# Patient Record
Sex: MALE | Race: WHITE | NOT HISPANIC OR LATINO
[De-identification: names, ages, dates, MRNs, and addresses within clinical notes are randomized per-mention and may not be internally consistent; named-entity substitution may affect disease eponyms.]

---
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
## 2021-01-28 NOTE — ED ADULT NURSE NOTE - CHPI ED NUR TIMING2
· Med Name & Dose: cytomel  · Last refill was 2-7-20 Number of Refills sent: 0  · Quantity of medication given 90 day supply  · Last visit for that condition 12-11-19  · Date of next appt: Visit date not found  · Date of any Lab results pertaining to medication: tsh 3-4-19    Patient is overdue for TSH. Last seen 12-11-19. No future appointment scheduled. Please advise because patient is requesting 90 day supply. TSH order placed.                Negative gradual onset

## 2021-07-21 ENCOUNTER — TRANSCRIPTION ENCOUNTER (OUTPATIENT)
Age: 21
End: 2021-07-21

## 2021-11-08 ENCOUNTER — EMERGENCY (EMERGENCY)
Facility: HOSPITAL | Age: 21
LOS: 1 days | Discharge: HOME | End: 2021-11-08
Attending: EMERGENCY MEDICINE | Admitting: EMERGENCY MEDICINE
Payer: SELF-PAY

## 2021-11-08 VITALS
WEIGHT: 160.06 LBS | DIASTOLIC BLOOD PRESSURE: 79 MMHG | OXYGEN SATURATION: 97 % | TEMPERATURE: 98 F | HEART RATE: 110 BPM | HEIGHT: 67 IN | RESPIRATION RATE: 17 BRPM | SYSTOLIC BLOOD PRESSURE: 145 MMHG

## 2021-11-08 VITALS
DIASTOLIC BLOOD PRESSURE: 68 MMHG | HEART RATE: 71 BPM | SYSTOLIC BLOOD PRESSURE: 126 MMHG | OXYGEN SATURATION: 98 % | RESPIRATION RATE: 16 BRPM

## 2021-11-08 DIAGNOSIS — Z98.890 OTHER SPECIFIED POSTPROCEDURAL STATES: Chronic | ICD-10-CM

## 2021-11-08 DIAGNOSIS — S01.01XA LACERATION WITHOUT FOREIGN BODY OF SCALP, INITIAL ENCOUNTER: Chronic | ICD-10-CM

## 2021-11-08 PROCEDURE — 99284 EMERGENCY DEPT VISIT MOD MDM: CPT

## 2021-11-08 PROCEDURE — 72125 CT NECK SPINE W/O DYE: CPT | Mod: 26,MA

## 2021-11-08 PROCEDURE — 99284 EMERGENCY DEPT VISIT MOD MDM: CPT | Mod: 25

## 2021-11-08 PROCEDURE — 72125 CT NECK SPINE W/O DYE: CPT | Mod: MA

## 2021-11-08 RX ORDER — LIDOCAINE 4 G/100G
1 CREAM TOPICAL
Qty: 30 | Refills: 0
Start: 2021-11-08 | End: 2021-12-07

## 2021-11-08 RX ORDER — IBUPROFEN 200 MG
600 TABLET ORAL ONCE
Refills: 0 | Status: COMPLETED | OUTPATIENT
Start: 2021-11-08 | End: 2021-11-08

## 2021-11-08 RX ORDER — IBUPROFEN 200 MG
1 TABLET ORAL
Qty: 20 | Refills: 0
Start: 2021-11-08 | End: 2021-11-12

## 2021-11-08 RX ORDER — CYCLOBENZAPRINE HYDROCHLORIDE 10 MG/1
1 TABLET, FILM COATED ORAL
Qty: 12 | Refills: 0
Start: 2021-11-08 | End: 2021-11-11

## 2021-11-08 RX ADMIN — Medication 600 MILLIGRAM(S): at 11:39

## 2021-11-08 RX ADMIN — Medication 600 MILLIGRAM(S): at 12:39
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
## 2021-11-08 NOTE — ED ADULT NURSE NOTE - NSIMPLEMENTINTERV_GEN_ALL_ED
Implemented All Universal Safety Interventions:  [place] to call system. Call bell, personal items and telephone within reach. Instruct patient to call for assistance. Room bathroom lighting operational. Non-slip footwear when patient is off stretcher. Physically safe environment: no spills, clutter or unnecessary equipment. Stretcher in lowest position, wheels locked, appropriate side rails in place.

## 2021-11-08 NOTE — ED ADULT NURSE NOTE - NSICDXPASTSURGICALHX_GEN_ALL_CORE_FT
PAST SURGICAL HISTORY:  H/O circumcision     History of ankle surgery     Scalp laceration s/p staples Summer 2015
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
## 2021-11-08 NOTE — ED PROVIDER NOTE - PATIENT PORTAL LINK FT
You can access the FollowMyHealth Patient Portal offered by [facility] by registering at the following website: [email or web address]. By joining [employer]’s FollowMyHealth portal, you will also be able to view your health information using other applications (apps) compatible with our system.
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
## 2021-11-08 NOTE — ED PROVIDER NOTE - NSFOLLOWUPINSTRUCTIONS_ED_ALL_ED_FT
Cervical Strain    WHAT YOU NEED TO KNOW:    A cervical strain is a stretched or torn muscle or tendon in your neck. Tendons are strong tissues that connect muscles to bones.    DISCHARGE INSTRUCTIONS:    Return to the emergency department if:   •You have pain or numbness from your shoulder down to your hand.      •You have problems with your vision, hearing, or balance.      •You feel confused or cannot concentrate.      •You have problems with movement and strength.      Call your doctor if:   •You have increased swelling or pain in your neck.       •You have questions or concerns about your condition or care.      Medicines: You may need any of the following:   •Acetaminophen decreases pain and fever. It is available without a doctor's order. Ask how much to take and how often to take it. Follow directions. Read the labels of all other medicines you are using to see if they also contain acetaminophen, or ask your doctor or pharmacist. Acetaminophen can cause liver damage if not taken correctly. Do not use more than 4 grams (4,000 milligrams) total of acetaminophen in one day.       •NSAIDs, such as ibuprofen, help decrease swelling, pain, and fever. This medicine is available with or without a doctor's order. NSAIDs can cause stomach bleeding or kidney problems in certain people. If you take blood thinner medicine, always ask your healthcare provider if NSAIDs are safe for you. Always read the medicine label and follow directions.      •Muscle relaxers help decrease pain and muscle spasms.      •Prescription pain medicine may be given. Ask your healthcare provider how to take this medicine safely. Some prescription pain medicines contain acetaminophen. Do not take other medicines that contain acetaminophen without talking to your healthcare provider. Too much acetaminophen may cause liver damage. Prescription pain medicine may cause constipation. Ask your healthcare provider how to prevent or treat constipation.       •Take your medicine as directed. Contact your healthcare provider if you think your medicine is not helping or if you have side effects. Tell him or her if you are allergic to any medicine. Keep a list of the medicines, vitamins, and herbs you take. Include the amounts, and when and why you take them. Bring the list or the pill bottles to follow-up visits. Carry your medicine list with you in case of an emergency.      Manage your symptoms:   •Apply heat on your neck for 15 to 20 minutes, 4 to 6 times a day or as directed. Heat helps decrease pain, stiffness, and muscle spasms.      •Begin gentle neck exercises as soon as you can move your neck without pain. Exercises will help decrease stiffness and improve the strength and movement of your neck. Ask your healthcare provider what kind of exercises you should do.      •Gradually return to your usual activities as directed. Stop if you have pain. Avoid activities that can cause more damage to your neck, such as heavy lifting or strenuous exercise.      •Sleep without a pillow to help decrease pain. Instead, roll a small towel tightly and place it under your neck.       •Go to physical therapy as directed. A physical therapist teaches you exercises to help improve movement and strength, and to decrease pain.       Prevent another neck injury:   •Drive safely. Make sure everyone in your car wears a seatbelt. A seatbelt can save your life if you are in an accident. Do not use your cell phone when you are driving. This could distract you and cause an accident. Pull over if you need to make a call or send a text message.       •Wear helmets, lifejackets, and protective gear. Always wear a helmet when you ride a bike or motorcycle, go skiing, or play sports that could cause a head injury. Wear protective equipment when you play sports. Wear a lifejacket when you are on a boat or doing water sports.       Follow up with your doctor as directed: You may be referred to an orthopedist or physical therapies. Write down your questions so you remember to ask them during your visits.

## 2021-12-31 ENCOUNTER — EMERGENCY (EMERGENCY)
Facility: HOSPITAL | Age: 21
LOS: 1 days | Discharge: HOME | End: 2021-12-31
Attending: EMERGENCY MEDICINE | Admitting: EMERGENCY MEDICINE
Payer: COMMERCIAL

## 2021-12-31 VITALS
DIASTOLIC BLOOD PRESSURE: 76 MMHG | TEMPERATURE: 98 F | WEIGHT: 156.53 LBS | HEART RATE: 86 BPM | HEIGHT: 67 IN | RESPIRATION RATE: 18 BRPM | OXYGEN SATURATION: 100 % | SYSTOLIC BLOOD PRESSURE: 123 MMHG

## 2021-12-31 DIAGNOSIS — S01.01XA LACERATION WITHOUT FOREIGN BODY OF SCALP, INITIAL ENCOUNTER: Chronic | ICD-10-CM

## 2021-12-31 DIAGNOSIS — Z98.890 OTHER SPECIFIED POSTPROCEDURAL STATES: Chronic | ICD-10-CM

## 2021-12-31 PROCEDURE — 99284 EMERGENCY DEPT VISIT MOD MDM: CPT

## 2021-12-31 PROCEDURE — 99284 EMERGENCY DEPT VISIT MOD MDM: CPT | Mod: 25

## 2021-12-31 PROCEDURE — 70450 CT HEAD/BRAIN W/O DYE: CPT | Mod: MA

## 2021-12-31 PROCEDURE — 70450 CT HEAD/BRAIN W/O DYE: CPT | Mod: 26,MA

## 2021-12-31 RX ORDER — IBUPROFEN 200 MG
600 TABLET ORAL ONCE
Refills: 0 | Status: COMPLETED | OUTPATIENT
Start: 2021-12-31 | End: 2021-12-31

## 2021-12-31 RX ADMIN — Medication 600 MILLIGRAM(S): at 01:08

## 2021-12-31 RX ADMIN — Medication 600 MILLIGRAM(S): at 01:38

## 2021-12-31 NOTE — ED ADULT NURSE NOTE - OBJECTIVE STATEMENT
P tis alert, came to the E due to head pain after he fell backwards while snowboarding yesterday afternoon. Denies LOC or nausea or vomiting.WithHeadache of 6/10

## 2021-12-31 NOTE — ED PROVIDER NOTE - CLINICAL SUMMARY MEDICAL DECISION MAKING FREE TEXT BOX
pt p/w head ache s/p fall while skate boarding, normal Neuro exam pt p/w head ache s/p fall while skate boarding, normal Neuro exam, negative CT scan

## 2021-12-31 NOTE — ED PROVIDER NOTE - OBJECTIVE STATEMENT
22 y/o Male with no sig PMHX presents to ED c/o intense headache s/o fall backwards and hitting head s/p fall while skate boarding, no N/V , no LOC

## 2021-12-31 NOTE — ED PROVIDER NOTE - PATIENT PORTAL LINK FT
You can access the FollowMyHealth Patient Portal offered by [facility] by registering at the following website: [email or web address]. By joining [employer]’s FollowMyHealth portal, you will also be able to view your health information using other applications (apps) compatible with our system.

## 2022-05-02 NOTE — ED PROVIDER NOTE - CHPI ED SYMPTOMS NEG
Denied    Refills on file at requested pharmacy.    Last RX written: 4-29-22  #90  R-2     no deformity/no back pain/no numbness/no tingling/no weakness/no fever

## 2022-10-14 NOTE — ED ADULT NURSE NOTE - NS ED NURSE DISCH DISPOSITION
Discharged Valtrex Counseling: I discussed with the patient the risks of valacyclovir including but not limited to kidney damage, nausea, vomiting and severe allergy.  The patient understands that if the infection seems to be worsening or is not improving, they are to call.

## 2023-01-26 NOTE — ED PROVIDER NOTE - CPE EDP ENMT NORM
normal... Double O-Z Flap Text: The defect edges were debeveled with a #15 scalpel blade.  Given the location of the defect, shape of the defect and the proximity to free margins a Double O-Z flap was deemed most appropriate.  Using a sterile surgical marker, an appropriate transposition flap was drawn incorporating the defect and placing the expected incisions within the relaxed skin tension lines where possible. The area thus outlined was incised deep to adipose tissue with a #15 scalpel blade.  The skin margins were undermined to an appropriate distance in all directions utilizing iris scissors.

## 2023-05-09 NOTE — ED ADULT NURSE NOTE - BREATHING, MLM
Follow up in the office with Dr. Raghav Rodriges on June 2nd as scheduled (with chest x-ray). Keep area clean/dry with bandage for 24 hours. Do not bathe or shower tonight, starting tomorrow just shower for the next few days. Watch for any signs of bleeding or infection. Call the office at [phone] for any questions. Thoracentesis: What to Expect at Home  Your Recovery  Thoracentesis (say \"igns-qh-sxf-ANNA MARIE-sis\") is a procedure to remove fluid from the space between the lungs and the chest wall (pleural space). This procedure may also be called a \"chest tap. \" It's normal to have a small amount of fluid in the pleural space. But too much fluid can build up because of problems such as infection, heart failure, or lung cancer. The procedure may have been done to help with shortness of breath and pain caused by the fluid buildup. Or you may have had this procedure so the doctor could test the fluid to find the cause of the buildup. Your chest may be sore where the doctor put the needle or catheter into your skin (the puncture site). This usually gets better after a day or two. You can go back to work or your normal activities as soon as you feel up to it. If a large amount of pleural fluid was removed during the procedure, you will probably be able to breathe more easily. If more pleural fluid collects and needs to be removed, another thoracentesis may be done later. This care sheet gives you a general idea about how long it will take for you to recover. But each person recovers at a different pace. Follow the steps below to feel better as quickly as possible. How can you care for yourself at home? Activity    Rest when you feel tired. Getting enough sleep will help you recover. Avoid strenuous activities, such as bicycle riding, jogging, weight lifting, or aerobic exercise, until your doctor says it is okay. You may shower.  Do not take a bath until the puncture site has healed, or until your doctor tells you Spontaneous, unlabored and symmetrical

## 2023-11-24 NOTE — ED ADULT NURSE NOTE - NS_ED_NURSE_TEACHING_TOPIC_ED_A_ED
(V5) oriented
Orthopedic/Medications/pt d/c with lidocaine patch in placed, states he will remove after 12 hours./Respiratory

## 2024-01-03 NOTE — ED PROCEDURE NOTE - NS ED ATTENDING NAME FT
Mcgarry
Is This A New Presentation, Or A Follow-Up?: Skin Lesions
How Severe Is Your Skin Lesion?: mild
Have Your Skin Lesions Been Treated?: not been treated

## 2024-01-26 ENCOUNTER — APPOINTMENT (OUTPATIENT)
Dept: INTERNAL MEDICINE | Facility: CLINIC | Age: 24
End: 2024-01-26
Payer: COMMERCIAL

## 2024-01-26 VITALS
DIASTOLIC BLOOD PRESSURE: 66 MMHG | HEART RATE: 84 BPM | BODY MASS INDEX: 25.58 KG/M2 | SYSTOLIC BLOOD PRESSURE: 102 MMHG | WEIGHT: 163 LBS | OXYGEN SATURATION: 98 % | HEIGHT: 67 IN | TEMPERATURE: 97.4 F | RESPIRATION RATE: 16 BRPM

## 2024-01-26 DIAGNOSIS — H61.22 IMPACTED CERUMEN, LEFT EAR: ICD-10-CM

## 2024-01-26 DIAGNOSIS — R04.0 EPISTAXIS: ICD-10-CM

## 2024-01-26 DIAGNOSIS — L29.0 PRURITUS ANI: ICD-10-CM

## 2024-01-26 DIAGNOSIS — H91.91 UNSPECIFIED HEARING LOSS, RIGHT EAR: ICD-10-CM

## 2024-01-26 DIAGNOSIS — Q66.50 CONGENITAL PES PLANUS, UNSPECIFIED FOOT: ICD-10-CM

## 2024-01-26 DIAGNOSIS — Z00.00 ENCOUNTER FOR GENERAL ADULT MEDICAL EXAMINATION W/OUT ABNORMAL FINDINGS: ICD-10-CM

## 2024-01-26 PROCEDURE — 99204 OFFICE O/P NEW MOD 45 MIN: CPT

## 2024-01-26 RX ORDER — CAMPHOR 0.45 %
2 GEL (GRAM) TOPICAL
Qty: 1 | Refills: 0 | Status: ACTIVE | COMMUNITY
Start: 2024-01-26 | End: 1900-01-01

## 2024-01-26 NOTE — PHYSICAL EXAM
[Normal Oropharynx] : the oropharynx was normal [Normal] : normal gait, coordination grossly intact, no focal deficits and deep tendon reflexes were 2+ and symmetric [de-identified] :  left earwax removed with minimal difficulty.  TMs appear normal.  Nasal mucosa is abraded, no blood clots [FreeTextEntry1] :   Mild perineal erythema along the midline vesicles or ulceration. [de-identified] :  small varicocele [de-identified] :  mild pes planus [de-identified] :  mildly anxious

## 2024-01-26 NOTE — HISTORY OF PRESENT ILLNESS
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   denies new activities of sweating, history of STIs.  Has been is stable monogamous relationship for 5 years.    Brings up a couple other concerns:nosebleeds this winter, nasal congestion, decreased hearing in the right ear, persistent not intermittent without fullness or vertigo or tinnitus, and testicular mass.   Has an appointment with ENT in a few days.

## 2024-01-26 NOTE — HEALTH RISK ASSESSMENT
[Excellent] : ~his/her~ current health as excellent [Very Good] : ~his/her~  mood as very good [4 or more  times a week (4 pts)] : 4 or more  times a week (4 points) [1 or 2 (0 pts)] : 1 or 2 (0 points) [Weekly (3 pts)] : Weekly (3 points) [Yes] : In the past 12 months have you used drugs other than those required for medical reasons? Yes [No falls in past year] : Patient reported no falls in the past year [0] : 1) Little interest or pleasure doing things: Not at all (0) [1] : 2) Feeling down, depressed, or hopeless for several days (1) [PHQ-2 Negative - No further assessment needed] : PHQ-2 Negative - No further assessment needed [Patient declined mammogram] : Patient declined mammogram [Patient declined PAP Smear] : Patient declined PAP Smear [Patient declined bone density test] : Patient declined bone density test [Patient declined colonoscopy] : Patient declined colonoscopy [HIV test declined] : HIV test declined [Hepatitis C test declined] : Hepatitis C test declined [None] : None [With Significant Other] : lives with significant other [Employed] : employed [College] : College [Single] : single [Sexually Active] : sexually active [Feels Safe at Home] : Feels safe at home [Fully functional (bathing, dressing, toileting, transferring, walking, feeding)] : Fully functional (bathing, dressing, toileting, transferring, walking, feeding) [Fully functional (using the telephone, shopping, preparing meals, housekeeping, doing laundry, using] : Fully functional and needs no help or supervision to perform IADLs (using the telephone, shopping, preparing meals, housekeeping, doing laundry, using transportation, managing medications and managing finances) [Reports normal functional visual acuity (ie: able to read med bottle)] : Reports normal functional visual acuity [Smoke Detector] : smoke detector [Carbon Monoxide Detector] : carbon monoxide detector [Safety elements used in home] : safety elements used in home [Seat Belt] :  uses seat belt [Current] : Current [5-9] : 5-9 [Audit-CScore] : 7 [de-identified] :   Smokes a gram of marijuana regularly, occasionally edibles [de-identified] :  usually works out in the gym [de-identified] :  tries eat healthy [[facility] [place]] :  8 [[id number]] :  1 [Change in mental status noted] : No change in mental status noted [Language] : denies difficulty with language [Behavior] : denies difficulty with behavior [Learning/Retaining New Information] : denies difficulty learning/retaining new information [Handling Complex Tasks] : denies difficulty handling complex tasks [Reasoning] : denies difficulty with reasoning [Spatial Ability and Orientation] : denies difficulty with spatial ability and orientation [High Risk Behavior] : no high risk behavior [Reports changes in hearing] : Reports no changes in hearing [Reports changes in vision] : Reports no changes in vision [Reports changes in dental health] : Reports no changes in dental health [Guns at Home] : no guns at home [Sunscreen] : does not use sunscreen [Travel to Developing Areas] : does not  travel to developing areas [TB Exposure] : is not being exposed to tuberculosis [Caregiver Concerns] : does not have caregiver concerns [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

## 2024-01-26 NOTE — ASSESSMENT
[FreeTextEntry1] : *pruritus ani.  No visible lesions other than subtle erythema and secondary or excoriation.  Patient bathes 3 times a day and is over aggressively  drying and scratching.   anxious, very somatically focused.  Asked him to put some cornstarch baby powder in his underwear in the morning, shower at night and apply topical Benadryl gel.  I am trying to hold off on topical steroid which he has been using without benefit (hydrocortisone 0.1% over-the-counter).  Asked him to dry gently.  *Epistaxis. *Nasal congestion Has appointment with ENT.  Asked him to start using Ayr nasal gel to moisturize   At night,and irrigate in the morning with a little bit of warm water.  *Subjective hearing loss, right ear.    I do not get a trauma or Meniere's story. Asked him to bring this up with ENT, document objective hearing loss.  Concern of course would be acoustic neuroma.  *Nicotine dependence.  Father with coronary artery disease, smoker. risk stratify further with LP(a), lipids, A1c.  Strongly asked him to quit.  *Marijuana use.  Fairly low level, but independent risk factor for cardiovascular disease.  *Binge alcohol use. Rule out anxiety disorder. Patient will drink a lot of alcohol on weekends, and has some had a low level regularly during the week.  No DUIs.  No eye-opener's.Precontemplative at this point.  *  Pes planus.  Arch support recommended  Left earwax.  Recommended monthly Debrox prophylaxis first of each month.  *Varicocele.  Patient reassured.  *Routine adult health maintenance Vaccinations: He believes he had Tdap in the last 10 years.  Has not had flu or COVID booster in the last 4 to 5 months.  Declines flu shot today. Screens for diabetes thyroid disease dyslipidemia elevated lipids will protein A anemia liver and kidney disease.  Disposition call with results.  Follow-up 1 year.  Time 45 minutes

## 2024-07-31 ENCOUNTER — EMERGENCY (EMERGENCY)
Facility: HOSPITAL | Age: 24
LOS: 1 days | End: 2024-07-31
Admitting: EMERGENCY MEDICINE
Payer: SELF-PAY

## 2024-07-31 ENCOUNTER — INPATIENT (INPATIENT)
Facility: HOSPITAL | Age: 24
LOS: 2 days | Discharge: HOME | DRG: 390 | End: 2024-08-03
Attending: STUDENT IN AN ORGANIZED HEALTH CARE EDUCATION/TRAINING PROGRAM | Admitting: INTERNAL MEDICINE
Payer: COMMERCIAL

## 2024-07-31 VITALS
DIASTOLIC BLOOD PRESSURE: 84 MMHG | RESPIRATION RATE: 16 BRPM | WEIGHT: 179.9 LBS | HEIGHT: 67 IN | HEART RATE: 82 BPM | OXYGEN SATURATION: 98 % | TEMPERATURE: 99 F | SYSTOLIC BLOOD PRESSURE: 139 MMHG

## 2024-07-31 VITALS
WEIGHT: 179.9 LBS | HEIGHT: 67 IN | RESPIRATION RATE: 18 BRPM | HEART RATE: 89 BPM | DIASTOLIC BLOOD PRESSURE: 66 MMHG | TEMPERATURE: 98 F | SYSTOLIC BLOOD PRESSURE: 120 MMHG | OXYGEN SATURATION: 98 %

## 2024-07-31 DIAGNOSIS — Z98.890 OTHER SPECIFIED POSTPROCEDURAL STATES: Chronic | ICD-10-CM

## 2024-07-31 DIAGNOSIS — S01.01XA LACERATION WITHOUT FOREIGN BODY OF SCALP, INITIAL ENCOUNTER: Chronic | ICD-10-CM

## 2024-07-31 PROCEDURE — L9991: CPT

## 2024-07-31 PROCEDURE — 99285 EMERGENCY DEPT VISIT HI MDM: CPT

## 2024-07-31 RX ORDER — ONDANSETRON HCL/PF 4 MG/2 ML
4 VIAL (ML) INJECTION ONCE
Refills: 0 | Status: COMPLETED | OUTPATIENT
Start: 2024-07-31 | End: 2024-07-31

## 2024-07-31 RX ORDER — BACTERIOSTATIC SODIUM CHLORIDE 0.9 %
1000 VIAL (ML) INJECTION ONCE
Refills: 0 | Status: COMPLETED | OUTPATIENT
Start: 2024-07-31 | End: 2024-07-31

## 2024-07-31 RX ORDER — FAMOTIDINE 40 MG/1
20 TABLET, FILM COATED ORAL ONCE
Refills: 0 | Status: COMPLETED | OUTPATIENT
Start: 2024-07-31 | End: 2024-07-31

## 2024-07-31 RX ADMIN — FAMOTIDINE 100 MILLIGRAM(S): 40 TABLET, FILM COATED ORAL at 23:56

## 2024-07-31 RX ADMIN — Medication 4 MILLIGRAM(S): at 23:57

## 2024-07-31 RX ADMIN — Medication 1000 MILLILITER(S): at 23:56

## 2024-08-01 DIAGNOSIS — K56.1 INTUSSUSCEPTION: ICD-10-CM

## 2024-08-01 DIAGNOSIS — K64.9 UNSPECIFIED HEMORRHOIDS: ICD-10-CM

## 2024-08-01 DIAGNOSIS — K85.90 ACUTE PANCREATITIS WITHOUT NECROSIS OR INFECTION, UNSPECIFIED: ICD-10-CM

## 2024-08-01 LAB
ALBUMIN SERPL ELPH-MCNC: 4 G/DL — SIGNIFICANT CHANGE UP (ref 3.3–5)
ALBUMIN SERPL ELPH-MCNC: 4.1 G/DL — SIGNIFICANT CHANGE UP (ref 3.3–5)
ALP SERPL-CCNC: 86 U/L — SIGNIFICANT CHANGE UP (ref 40–120)
ALP SERPL-CCNC: 90 U/L — SIGNIFICANT CHANGE UP (ref 40–120)
ALT FLD-CCNC: 15 U/L — SIGNIFICANT CHANGE UP (ref 10–45)
ALT FLD-CCNC: 17 U/L — SIGNIFICANT CHANGE UP (ref 10–45)
AMYLASE P1 CFR SERPL: 105 U/L — SIGNIFICANT CHANGE UP (ref 25–125)
ANION GAP SERPL CALC-SCNC: 11 MMOL/L — SIGNIFICANT CHANGE UP (ref 5–17)
ANION GAP SERPL CALC-SCNC: 8 MMOL/L — SIGNIFICANT CHANGE UP (ref 5–17)
APTT BLD: 34.5 SEC — SIGNIFICANT CHANGE UP (ref 24.5–35.6)
AST SERPL-CCNC: 15 U/L — SIGNIFICANT CHANGE UP (ref 10–40)
AST SERPL-CCNC: 18 U/L — SIGNIFICANT CHANGE UP (ref 10–40)
BASOPHILS # BLD AUTO: 0.04 K/UL — SIGNIFICANT CHANGE UP (ref 0–0.2)
BASOPHILS # BLD AUTO: 0.04 K/UL — SIGNIFICANT CHANGE UP (ref 0–0.2)
BASOPHILS NFR BLD AUTO: 0.4 % — SIGNIFICANT CHANGE UP (ref 0–2)
BASOPHILS NFR BLD AUTO: 0.5 % — SIGNIFICANT CHANGE UP (ref 0–2)
BILIRUB SERPL-MCNC: 0.5 MG/DL — SIGNIFICANT CHANGE UP (ref 0.2–1.2)
BILIRUB SERPL-MCNC: 0.6 MG/DL — SIGNIFICANT CHANGE UP (ref 0.2–1.2)
BLD GP AB SCN SERPL QL: SIGNIFICANT CHANGE UP
BUN SERPL-MCNC: 6 MG/DL — LOW (ref 7–23)
BUN SERPL-MCNC: 9 MG/DL — SIGNIFICANT CHANGE UP (ref 7–23)
CALCIUM SERPL-MCNC: 9.3 MG/DL — SIGNIFICANT CHANGE UP (ref 8.4–10.5)
CALCIUM SERPL-MCNC: 9.5 MG/DL — SIGNIFICANT CHANGE UP (ref 8.4–10.5)
CHLORIDE SERPL-SCNC: 102 MMOL/L — SIGNIFICANT CHANGE UP (ref 96–108)
CHLORIDE SERPL-SCNC: 102 MMOL/L — SIGNIFICANT CHANGE UP (ref 96–108)
CO2 SERPL-SCNC: 27 MMOL/L — SIGNIFICANT CHANGE UP (ref 22–31)
CO2 SERPL-SCNC: 27 MMOL/L — SIGNIFICANT CHANGE UP (ref 22–31)
CREAT SERPL-MCNC: 0.96 MG/DL — SIGNIFICANT CHANGE UP (ref 0.5–1.3)
CREAT SERPL-MCNC: 1.1 MG/DL — SIGNIFICANT CHANGE UP (ref 0.5–1.3)
EGFR: 114 ML/MIN/1.73M2 — SIGNIFICANT CHANGE UP
EGFR: 97 ML/MIN/1.73M2 — SIGNIFICANT CHANGE UP
EOSINOPHIL # BLD AUTO: 0.05 K/UL — SIGNIFICANT CHANGE UP (ref 0–0.5)
EOSINOPHIL # BLD AUTO: 0.13 K/UL — SIGNIFICANT CHANGE UP (ref 0–0.5)
EOSINOPHIL NFR BLD AUTO: 0.6 % — SIGNIFICANT CHANGE UP (ref 0–6)
EOSINOPHIL NFR BLD AUTO: 1.2 % — SIGNIFICANT CHANGE UP (ref 0–6)
GLUCOSE SERPL-MCNC: 100 MG/DL — HIGH (ref 70–99)
GLUCOSE SERPL-MCNC: 102 MG/DL — HIGH (ref 70–99)
HCT VFR BLD CALC: 42.2 % — SIGNIFICANT CHANGE UP (ref 39–50)
HCT VFR BLD CALC: 42.8 % — SIGNIFICANT CHANGE UP (ref 39–50)
HGB BLD-MCNC: 14.8 G/DL — SIGNIFICANT CHANGE UP (ref 13–17)
HGB BLD-MCNC: 15.2 G/DL — SIGNIFICANT CHANGE UP (ref 13–17)
IGG FLD-MCNC: 753 MG/DL — SIGNIFICANT CHANGE UP (ref 610–1660)
IGG1 SER-MCNC: 343 MG/DL — SIGNIFICANT CHANGE UP (ref 240–1118)
IGG2 SER-MCNC: 373 MG/DL — SIGNIFICANT CHANGE UP (ref 124–549)
IGG3 SER-MCNC: 74.8 MG/DL — SIGNIFICANT CHANGE UP (ref 15–102)
IGG4 SER-MCNC: 33.3 MG/DL — SIGNIFICANT CHANGE UP (ref 1–123)
IMM GRANULOCYTES NFR BLD AUTO: 0.2 % — SIGNIFICANT CHANGE UP (ref 0–0.9)
IMM GRANULOCYTES NFR BLD AUTO: 0.4 % — SIGNIFICANT CHANGE UP (ref 0–0.9)
INR BLD: 0.99 RATIO — SIGNIFICANT CHANGE UP (ref 0.85–1.18)
LACTATE SERPL-SCNC: 0.7 MMOL/L — SIGNIFICANT CHANGE UP (ref 0.7–2)
LIDOCAIN IGE QN: 351 U/L — HIGH (ref 16–77)
LIDOCAIN IGE QN: >375 U/L — HIGH (ref 16–77)
LYMPHOCYTES # BLD AUTO: 1.39 K/UL — SIGNIFICANT CHANGE UP (ref 1–3.3)
LYMPHOCYTES # BLD AUTO: 17.1 % — SIGNIFICANT CHANGE UP (ref 13–44)
LYMPHOCYTES # BLD AUTO: 2.79 K/UL — SIGNIFICANT CHANGE UP (ref 1–3.3)
LYMPHOCYTES # BLD AUTO: 25.8 % — SIGNIFICANT CHANGE UP (ref 13–44)
MAGNESIUM SERPL-MCNC: 1.9 MG/DL — SIGNIFICANT CHANGE UP (ref 1.6–2.6)
MCHC RBC-ENTMCNC: 30.7 PG — SIGNIFICANT CHANGE UP (ref 27–34)
MCHC RBC-ENTMCNC: 30.8 PG — SIGNIFICANT CHANGE UP (ref 27–34)
MCHC RBC-ENTMCNC: 35.1 GM/DL — SIGNIFICANT CHANGE UP (ref 32–36)
MCHC RBC-ENTMCNC: 35.5 GM/DL — SIGNIFICANT CHANGE UP (ref 32–36)
MCV RBC AUTO: 86.8 FL — SIGNIFICANT CHANGE UP (ref 80–100)
MCV RBC AUTO: 87.6 FL — SIGNIFICANT CHANGE UP (ref 80–100)
MONOCYTES # BLD AUTO: 0.7 K/UL — SIGNIFICANT CHANGE UP (ref 0–0.9)
MONOCYTES # BLD AUTO: 0.85 K/UL — SIGNIFICANT CHANGE UP (ref 0–0.9)
MONOCYTES NFR BLD AUTO: 7.9 % — SIGNIFICANT CHANGE UP (ref 2–14)
MONOCYTES NFR BLD AUTO: 8.6 % — SIGNIFICANT CHANGE UP (ref 2–14)
NEUTROPHILS # BLD AUTO: 5.93 K/UL — SIGNIFICANT CHANGE UP (ref 1.8–7.4)
NEUTROPHILS # BLD AUTO: 6.97 K/UL — SIGNIFICANT CHANGE UP (ref 1.8–7.4)
NEUTROPHILS NFR BLD AUTO: 64.3 % — SIGNIFICANT CHANGE UP (ref 43–77)
NEUTROPHILS NFR BLD AUTO: 73 % — SIGNIFICANT CHANGE UP (ref 43–77)
NRBC # BLD: 0 /100 WBCS — SIGNIFICANT CHANGE UP (ref 0–0)
NRBC # BLD: 0 /100 WBCS — SIGNIFICANT CHANGE UP (ref 0–0)
PLATELET # BLD AUTO: 109 K/UL — LOW (ref 150–400)
PLATELET # BLD AUTO: 115 K/UL — LOW (ref 150–400)
POTASSIUM SERPL-MCNC: 3.8 MMOL/L — SIGNIFICANT CHANGE UP (ref 3.5–5.3)
POTASSIUM SERPL-MCNC: 4 MMOL/L — SIGNIFICANT CHANGE UP (ref 3.5–5.3)
POTASSIUM SERPL-SCNC: 3.8 MMOL/L — SIGNIFICANT CHANGE UP (ref 3.5–5.3)
POTASSIUM SERPL-SCNC: 4 MMOL/L — SIGNIFICANT CHANGE UP (ref 3.5–5.3)
PROT SERPL-MCNC: 7.3 G/DL — SIGNIFICANT CHANGE UP (ref 6–8.3)
PROT SERPL-MCNC: 7.7 G/DL — SIGNIFICANT CHANGE UP (ref 6–8.3)
PROTHROM AB SERPL-ACNC: 11.6 SEC — SIGNIFICANT CHANGE UP (ref 9.5–13)
RBC # BLD: 4.82 M/UL — SIGNIFICANT CHANGE UP (ref 4.2–5.8)
RBC # BLD: 4.93 M/UL — SIGNIFICANT CHANGE UP (ref 4.2–5.8)
RBC # FLD: 11.2 % — SIGNIFICANT CHANGE UP (ref 10.3–14.5)
RBC # FLD: 11.4 % — SIGNIFICANT CHANGE UP (ref 10.3–14.5)
SODIUM SERPL-SCNC: 137 MMOL/L — SIGNIFICANT CHANGE UP (ref 135–145)
SODIUM SERPL-SCNC: 140 MMOL/L — SIGNIFICANT CHANGE UP (ref 135–145)
TRIGL SERPL-MCNC: 45 MG/DL — SIGNIFICANT CHANGE UP
WBC # BLD: 10.82 K/UL — HIGH (ref 3.8–10.5)
WBC # BLD: 8.13 K/UL — SIGNIFICANT CHANGE UP (ref 3.8–10.5)
WBC # FLD AUTO: 10.82 K/UL — HIGH (ref 3.8–10.5)
WBC # FLD AUTO: 8.13 K/UL — SIGNIFICANT CHANGE UP (ref 3.8–10.5)

## 2024-08-01 PROCEDURE — 99223 1ST HOSP IP/OBS HIGH 75: CPT

## 2024-08-01 PROCEDURE — 74177 CT ABD & PELVIS W/CONTRAST: CPT | Mod: 26,MC

## 2024-08-01 PROCEDURE — 74176 CT ABD & PELVIS W/O CONTRAST: CPT | Mod: 26,59

## 2024-08-01 PROCEDURE — 93010 ELECTROCARDIOGRAM REPORT: CPT

## 2024-08-01 RX ORDER — ACETAMINOPHEN 500 MG
650 TABLET ORAL EVERY 6 HOURS
Refills: 0 | Status: DISCONTINUED | OUTPATIENT
Start: 2024-08-01 | End: 2024-08-03

## 2024-08-01 RX ORDER — IOHEXOL 240 MG/ML
30 INJECTION, SOLUTION INTRATHECAL; INTRAVASCULAR; INTRAVENOUS; ORAL ONCE
Refills: 0 | Status: COMPLETED | OUTPATIENT
Start: 2024-08-01 | End: 2024-08-01

## 2024-08-01 RX ORDER — DEXTROSE MONOHYDRATE, SODIUM CHLORIDE, SODIUM LACTATE, CALCIUM CHLORIDE, MAGNESIUM CHLORIDE 1.5; 538; 448; 18.4; 5.08 G/100ML; MG/100ML; MG/100ML; MG/100ML; MG/100ML
1000 SOLUTION INTRAPERITONEAL
Refills: 0 | Status: DISCONTINUED | OUTPATIENT
Start: 2024-08-01 | End: 2024-08-02

## 2024-08-01 RX ORDER — ONDANSETRON HCL/PF 4 MG/2 ML
4 VIAL (ML) INJECTION EVERY 6 HOURS
Refills: 0 | Status: DISCONTINUED | OUTPATIENT
Start: 2024-08-01 | End: 2024-08-03

## 2024-08-01 RX ORDER — PANTOPRAZOLE SODIUM 20 MG/1
40 TABLET, DELAYED RELEASE ORAL DAILY
Refills: 0 | Status: DISCONTINUED | OUTPATIENT
Start: 2024-08-01 | End: 2024-08-03

## 2024-08-01 RX ORDER — ONDANSETRON HCL/PF 4 MG/2 ML
4 VIAL (ML) INJECTION ONCE
Refills: 0 | Status: COMPLETED | OUTPATIENT
Start: 2024-08-01 | End: 2024-08-01

## 2024-08-01 RX ADMIN — FAMOTIDINE 20 MILLIGRAM(S): 40 TABLET, FILM COATED ORAL at 00:45

## 2024-08-01 RX ADMIN — IOHEXOL 30 MILLILITER(S): 240 INJECTION, SOLUTION INTRATHECAL; INTRAVASCULAR; INTRAVENOUS; ORAL at 08:51

## 2024-08-01 RX ADMIN — Medication 4 MILLIGRAM(S): at 06:52

## 2024-08-01 RX ADMIN — DEXTROSE MONOHYDRATE, SODIUM CHLORIDE, SODIUM LACTATE, CALCIUM CHLORIDE, MAGNESIUM CHLORIDE 125 MILLILITER(S): 1.5; 538; 448; 18.4; 5.08 SOLUTION INTRAPERITONEAL at 03:51

## 2024-08-01 RX ADMIN — Medication 4 MILLIGRAM(S): at 01:04

## 2024-08-01 RX ADMIN — Medication 1000 MILLILITER(S): at 00:45

## 2024-08-01 RX ADMIN — PANTOPRAZOLE SODIUM 40 MILLIGRAM(S): 20 TABLET, DELAYED RELEASE ORAL at 12:08

## 2024-08-01 RX ADMIN — Medication 4 MILLIGRAM(S): at 18:58

## 2024-08-01 NOTE — CONSULT NOTE ADULT - SUBJECTIVE AND OBJECTIVE BOX
Hospitalist note:      Reason for Admission: pancreatitis, nonobstructive intussusception  History of Present Illness:   23M no sigPMhx pw pancreatitis and nonobstructive intussusception. Pt related he drank sig more alcohol than usual on Saturday with a total of 7 drinks and the following day developed nausea with vomiting and diffuse abdominal pain, crampy persistent with waxing and waning of sxs. +decreased appetite, pain now mostly in the R side of the abdomen, Denied any fevers but has occ chills and sweats. No SOB/CP/cough, dysuria.  Last BM this am. +some abd bloating which seemed to have improved since in ED and now passing some gas. Last vomit was 3 days ago.  s/p IVFs, Zofran and Famotidine in ED.   In ED afebrile P; 89 BP: 120/66 sat 98% on RA      patient interviewed and examine din telemetry 229, mother and girlfriend present    says he passed flatus, still has abdominal discomfort    looks ok    afebrile    Heent-sclera anicteric    abdomen-slight distention and tympany, soft and essentially non tender    extrem-ok    labs:    wbc 10  h/h 15/42     K+ 3.8    creat 1.1    imaging(I personally reviewed):    < from: CT Abdomen and Pelvis w/ IV Cont (08.01.24 @ 00:41) >  IMPRESSION:    Acute interstitial pancreatitis.    Nonobstructive intussusception atthe level of the proximal jejunal loops   in the right side of abdomen (55:2).    These results were discussed via telephone at 8/1/2024 1:01 AM by Dr. Whittaker of radiology with Dr. Moise.    < end of copied text >  
INTERVAL HPI/OVERNIGHT EVENTS:  HPI:  23M no sigPMhx pw pancreatitis and nonobstructive intussusception. Pt related he drank sig more alcohol than usual on Saturday with a total of 7 drinks and the following day developed nausea with vomiting and diffuse abdominal pain, crampy persistent with waxing and waning of sxs. +decreased appetite, pain now mostly in the R side of the abdomen, Denied any fevers but has occ chills and sweats. No SOB/CP/cough, dysuria.  Last BM this am. +some abd bloating which seemed to have improved since in ED and now passing some gas. Last vomit was 3 days ago.  s/p IVFs, Zofran and Famotidine in ED.   In ED afebrile P; 89 BP: 120/66 sat 98% on RA  WBC: 10.82 hgb: 15.2 plt; 115 64%N BUN/cr: 9/1.1 lip: >375    GI consulted due to pancreatitis. Patient seen and examined at bedside. Denies nausea, vomiting, diarrhea, fever/chills bodyaches. He ntoes some RUQ pain on exam. + flatus, + small BM this afternoon. Denies previous h/o symptoms. He does complain of some rectal itching and burning at times which has been going on x 6 months. He notes some blood when he wipes after BM on occasion.       MEDICATIONS  (STANDING):  lactated ringers. 1000 milliLiter(s) (125 mL/Hr) IV Continuous <Continuous>  pantoprazole  Injectable 40 milliGRAM(s) IV Push daily    MEDICATIONS  (PRN):  acetaminophen     Tablet .. 650 milliGRAM(s) Oral every 6 hours PRN Mild Pain (1 - 3)  ondansetron Injectable 4 milliGRAM(s) IV Push every 6 hours PRN Nausea and/or Vomiting      Allergies    dairy products (Unknown)  lactose (Vomiting)  No Known Drug Allergies    Intolerances        PAST MEDICAL & SURGICAL HISTORY:  Triplanar ankle fracture, closed, right, sequela      H/O circumcision      Scalp laceration  s/p staples Summer 2015      History of ankle surgery          REVIEW OF SYSTEMS: negative unless indicated in HPI    PHYSICAL EXAM:   Vital Signs:  Vital Signs Last 24 Hrs  T(C): 36.9 (01 Aug 2024 11:01), Max: 37.3 (31 Jul 2024 20:37)  T(F): 98.5 (01 Aug 2024 11:01), Max: 99.2 (31 Jul 2024 20:37)  HR: 64 (01 Aug 2024 11:01) (64 - 97)  BP: 136/68 (01 Aug 2024 11:01) (120/66 - 139/84)  BP(mean): --  RR: 20 (01 Aug 2024 11:01) (16 - 20)  SpO2: 99% (01 Aug 2024 11:01) (98% - 99%)    Parameters below as of 01 Aug 2024 11:01  Patient On (Oxygen Delivery Method): room air      Daily Height in cm: 170.18 (31 Jul 2024 22:15)    Daily I&O's Summary      GENERAL:  Appears stated age,   HEENT:  NC/AT,  conjunctivae clear and pink, sclera -anicteric  CHEST:  Full & symmetric excursion, no increased effort, breath sounds clear  HEART:  Regular rhythm, S1, S2, no murmur  ABDOMEN:  Soft, LUQ tender, non-distended, normoactive bowel sounds  EXTEREMITIES:  no edema  SKIN:  No rash/erythema/warm/dry  NEURO:  Alert, oriented,       LABS:                        14.8   8.13  )-----------( 109      ( 01 Aug 2024 10:02 )             42.2     08-01    137  |  102  |  6<L>  ----------------------------<  102<H>  4.0   |  27  |  0.96    Ca    9.3      01 Aug 2024 10:02  Mg     1.9     08-01    TPro  7.3  /  Alb  4.0  /  TBili  0.6  /  DBili  x   /  AST  15  /  ALT  17  /  AlkPhos  86  08-01    PT/INR - ( 01 Aug 2024 10:02 )   PT: 11.6 sec;   INR: 0.99 ratio         PTT - ( 01 Aug 2024 10:02 )  PTT:34.5 sec  Urinalysis Basic - ( 01 Aug 2024 10:02 )    Color: x / Appearance: x / SG: x / pH: x  Gluc: 102 mg/dL / Ketone: x  / Bili: x / Urobili: x   Blood: x / Protein: x / Nitrite: x   Leuk Esterase: x / RBC: x / WBC x   Sq Epi: x / Non Sq Epi: x / Bacteria: x      amylaseAmylase: 105 U/L (08-01 @ 10:02)     lipaseLipase: 351 U/L (08-01 @ 10:02)  Lipase: >375 U/L (08-01 @ 00:00)    RADIOLOGY & ADDITIONAL TESTS:

## 2024-08-01 NOTE — CONSULT NOTE ADULT - PROBLEM SELECTOR RECOMMENDATION 9
Likely ETOH induced will r.o other causes   NPO- per surgery given possible intussusception  Advance to clear liquid diet with clear ensure supplement when ok with surgery   Continue IV fluids  Monitor lipase  Check amylase and IgG 4

## 2024-08-01 NOTE — ED PROVIDER NOTE - OBJECTIVE STATEMENT
23m presents to the ED c/o abd pain mid epigastric area since Sunday/Monay (3-4 days). +nausea/vomiting. No diarrhea. Small BM, insignificant acc to patient. The day prior to the onset of pain, pt states +ETOH. Does not drink etoh daily. No back pain.

## 2024-08-01 NOTE — H&P ADULT - NSHPREVIEWOFSYSTEMS_GEN_ALL_CORE
CONSTITUTIONAL: No fever, weight loss, or fatigue  EYES: No eye pain, visual disturbances, or discharge  ENMT:  No difficulty hearing, tinnitus, vertigo; No sinus or throat pain  NECK: No pain or stiffness  RESPIRATORY: No cough, wheezing, chills or hemoptysis; No shortness of breath  CARDIOVASCULAR: No chest pain, palpitations, dizziness, or leg swelling  GASTROINTESTINAL+diffuse abdominal  pain. + nausea, vomiting, no  hematemesis; No diarrhea or constipation. No melena or hematochezia.  GENITOURINARY: No dysuria, frequency, hematuria, or incontinence  NEUROLOGICAL: No headaches, memory loss, loss of strength, numbness, or tremors  SKIN: No itching, burning, rashes, or lesions   LYMPH NODES: No enlarged glands  ENDOCRINE: No heat or cold intolerance; No hair loss  MUSCULOSKELETAL: No joint pain or swelling; No muscle, back, or extremity pain  PSYCHIATRIC: No depression, anxiety, mood swings, or difficulty sleeping  HEME/LYMPH: No easy bruising, or bleeding gums  ALLERY AND IMMUNOLOGIC: No hives or eczema    IMPROVE VTE Individual Risk Assessment          RISK                                                          Points  [  ] Previous VTE                                                3  [  ] Thrombophilia                                             2  [  ] Lower limb paralysis                                   2        (unable to hold up >15 seconds)    [  ] Current Cancer                                             2         (within 6 months)  [  ] Immobilization > 24 hrs                              1  [  ] ICU/CCU stay > 24 hours                             1  [  ] Age > 60                                                         1    IMPROVE VTE Score:         [  0       ]    Total Risk Factor Score:    0 - 1:   Consider IPC  >2 - 3:  Thromboprophylaxis required (enoxaparin or SQ heparin)        >4:   High Risk: Thromboprophylaxis required (enoxaparin or SQ heparin), optional add IPC  **If CONTRAINDICATION to enoxaparin or SQ heparin, USE IPCs**

## 2024-08-01 NOTE — ED PROVIDER NOTE - CLINICAL SUMMARY MEDICAL DECISION MAKING FREE TEXT BOX
Hypothyroid
23m presents to the ED c/o abd pain mid epigastric area since Sunday/Monay (3-4 days). +nausea/vomiting. No diarrhea. Small BM, insignificant acc to patient. The day prior to the onset of pain, pt states +ETOH. Does not drink etoh daily. No back pain.   Exam as stated. Consider pancreatitis. Labs w lipase. CT    Results reviewed. Intussuception/Pancreatitis. Plan for admission. Patient signed out to incoming physician.  All decisions regarding the progression of care will be made at their discretion.

## 2024-08-01 NOTE — CONSULT NOTE ADULT - NS ATTEND AMEND GEN_ALL_CORE FT
Agree with assessment and plan as above.    24 y/o M with abdominal pain likely secondary to acute alcohol associated pancreatitis. S/p fluid resuscitation. Would continue with LR 1.5 cc/kg/hr for 20 hours if tolerating diet followed by gentle hydration if needed. Additionally complains anal itching and burning. Suspect this is due to hemorrhoids. High fiber diet, Sitz baths as well as topical Witch Hazel wipes. Counseled on healthier eating habits as well as EtOH cessation. Follow up surgery recommendations RE intussusception.      >75 minutes was spent on direct patient care

## 2024-08-01 NOTE — CONSULT NOTE ADULT - ASSESSMENT
a/p    pancreatitis  dehydration    intussusception on ct scan    admit  iv fluids  npo  repeat ct scan with oral contrast    gi consult(I spoke to gi)      thank you  dr freda michaels  cell#[phone]
  GI consulted due to pancreatitis. Patient seen and examined at bedside. Denies nausea, vomiting, diarrhea, fever/chills bodyaches. He notes some RUQ pain on exam. + flatus, + small BM this afternoon. Denies previous h/o symptoms. He does complain of some rectal itching and burning at times which has been going on x 6 months. He notes some blood when he wipes after BM on occasion.

## 2024-08-01 NOTE — H&P ADULT - HISTORY OF PRESENT ILLNESS
23M no sigPMhx pw pancreatitis and nonobstructive intussusception. Pt related he drank sig more alcohol than usual on Saturday with a total of 7 drinks and the following day developed nausea with vomiting and diffuse abdominal pain, crampy persistent with waxing and waning of sxs. +decreased appetite, pain now mostly in the R side of the abdomen, Denied any fevers but has occ chills and sweats. No SOB/CP/cough, dysuria.  Last BM this am. +some abd bloating which seemed to have improved since in ED and now passing some gas. Last vomit was 3 days ago.  s/p IVFs, Zofran and Famotidine in ED.   In ED afebrile P; 89 BP: 120/66 sat 98% on RA  WBC: 10.82 hgb: 15.2 plt; 115 64%N BUN/cr: 9/1.1 lip: >375  CTAP:   < from: CT Abdomen and Pelvis w/ IV Cont (08.01.24 @ 00:41) >  LIVER: Within normal limits.  BILE DUCTS: Normal caliber.  GALLBLADDER: Partially contracted.  SPLEEN: Within normal limits.  PANCREAS: Edematous tail of pancreas with adjacent peripancreatic edema   and trace fluid extends into the retroperitoneal spaces, compatible with   acute pancreatitis.  ADRENALS: Within normal limits.  KIDNEYS/URETERS: Within normal limits.    BLADDER: Within normal limits.  REPRODUCTIVE ORGANS: Prostate within normal limits.    BOWEL: No bowel obstruction. Nonobstructive intussusception at the level   of the proximal jejunal loops in the right side of abdomen (55:2).  PERITONEUM/RETROPERITONEUM: Within normal limits.  VESSELS: Normal aortic caliber.  LYMPH NODES: Shotty mesenteric and pericecal lymph nodes, likely reactive   in nature.  ABDOMINAL WALL: Within normal limits.  BONES: Within normal limits.    IMPRESSION:    Acute interstitial pancreatitis.    Nonobstructive intussusception atthe level of the proximal jejunal loops   in the right side of abdomen (55:2).    < end of copied text >

## 2024-08-01 NOTE — H&P ADULT - NSHPPHYSICALEXAM_GEN_ALL_CORE
Vital Signs Last 24 Hrs  T(C): 36.5 (31 Jul 2024 22:15), Max: 37.3 (31 Jul 2024 20:37)  T(F): 97.7 (31 Jul 2024 22:15), Max: 99.2 (31 Jul 2024 20:37)  HR: 89 (31 Jul 2024 22:15) (82 - 89)  BP: 120/66 (31 Jul 2024 22:15) (120/66 - 139/84)  BP(mean): --  RR: 18 (31 Jul 2024 22:15) (16 - 18)  SpO2: 98% (31 Jul 2024 22:15) (98% - 98%)    Parameters below as of 31 Jul 2024 22:15  Patient On (Oxygen Delivery Method): room air      Daily Height in cm: 170.18 (31 Jul 2024 22:15)    Daily   CAPILLARY BLOOD GLUCOSE        I&O's Summary      GENERAL: NAD  HEAD:  Normocephalic  EYES: EOMI, PERRLA, conjunctiva and sclera clear  ENMT: No tonsillar erythema, exudates, or enlargement; Moist mucous membranes, No lesions  NECK: Supple, No JVD, no bruit, normal thyroid  NERVOUS SYSTEM:  Alert & Oriented X3, Good concentration; grossly  Motor Strength 5/5 B/L upper and lower extremities; DTRs 2+ intact and symmetric  CHEST/LUNG: Clear to auscultation bilaterally; No rales, rhonchi, wheezing, or rubs  HEART: Regular rate and rhythm; No murmurs, rubs, or gallops  ABDOMEN: Soft, min tenderness on palp diffusely, mildly distended; Bowel sounds present  EXTREMITIES:  2+ Peripheral Pulses, No clubbing, cyanosis, or edema  LYMPH: No lymphadenopathy noted  SKIN: No rashes or lesions

## 2024-08-01 NOTE — ED PROVIDER NOTE - PHYSICAL EXAMINATION
Vitals: I have reviewed the patients vital signs  General: nontoxic appearing  HEENT: Atraumatic, normocephalic, airway patent  Eyes: EOMI, tracking appropriately  Neck: no tracheal deviation  Chest/Lungs: no trauma, symmetric chest rise, speaking in complete sentences,  no resp distress, lungs clear  Abd: Tender midepigastric and periumbilical. Tender percussion of mid back as well. No cva tenderness.   Heart: skin and extremities well perfused, regular rate and rhythm  Neuro: A+Ox3, ambulating without difficulty, appears non focal  MSK: strength at baseline in all extremities, no muscle wasting or atrophy  Skin: no cyanosis, no jaundice

## 2024-08-01 NOTE — H&P ADULT - NSHPLABSRESULTS_GEN_ALL_CORE
15.2   10.82 )-----------( 115      ( 01 Aug 2024 00:00 )             42.8       08-01    140  |  102  |  9   ----------------------------<  100<H>  3.8   |  27  |  1.10    Ca    9.5      01 Aug 2024 00:00    TPro  7.7  /  Alb  4.1  /  TBili  0.5  /  DBili  x   /  AST  18  /  ALT  15  /  AlkPhos  90  08-01         LIVER FUNCTIONS - ( 01 Aug 2024 00:00 )  Alb: 4.1 g/dL / Pro: 7.7 g/dL / ALK PHOS: 90 U/L / ALT: 15 U/L / AST: 18 U/L / GGT: x             Lipase: >375 U/L (08-01-24 @ 00:00)              Urinalysis Basic - ( 01 Aug 2024 00:00 )    Color: x / Appearance: x / SG: x / pH: x  Gluc: 100 mg/dL / Ketone: x  / Bili: x / Urobili: x   Blood: x / Protein: x / Nitrite: x   Leuk Esterase: x / RBC: x / WBC x   Sq Epi: x / Non Sq Epi: x / Bacteria: x      EKG:      CXR: wet read    CTAP:  r< from: CT Abdomen and Pelvis w/ IV Cont (08.01.24 @ 00:41) >      IMPRESSION:    Acute interstitial pancreatitis.    Nonobstructive intussusception atthe level of the proximal jejunal loops   in the right side of abdomen (55:2).    < end of copied text >

## 2024-08-01 NOTE — H&P ADULT - ASSESSMENT
23M no sigPMhx pw pancreatitis and nonobstructive intussusception.    #Pancreatitis likely ETOH induced  IV hydration  no evidence of gallstones on CT  normal LFTs    #Intussusception nonobstructive  Keep NPO.  serial abd exams.   antiemetics  PPI   Surgery Dr Franco already consulted by ED.     Girlfrienkatlyn Salazar at bedside updated  [facility] DIANE rev  D/W Dr Cates ED.

## 2024-08-02 ENCOUNTER — TRANSCRIPTION ENCOUNTER (OUTPATIENT)
Age: 24
End: 2024-08-02

## 2024-08-02 VITALS
HEART RATE: 63 BPM | SYSTOLIC BLOOD PRESSURE: 135 MMHG | DIASTOLIC BLOOD PRESSURE: 72 MMHG | TEMPERATURE: 97 F | OXYGEN SATURATION: 99 %

## 2024-08-02 LAB
ALBUMIN SERPL ELPH-MCNC: 3.8 G/DL — SIGNIFICANT CHANGE UP (ref 3.3–5)
ALP SERPL-CCNC: 91 U/L — SIGNIFICANT CHANGE UP (ref 40–120)
ALT FLD-CCNC: 15 U/L — SIGNIFICANT CHANGE UP (ref 10–45)
ANION GAP SERPL CALC-SCNC: 11 MMOL/L — SIGNIFICANT CHANGE UP (ref 5–17)
AST SERPL-CCNC: 17 U/L — SIGNIFICANT CHANGE UP (ref 10–40)
BILIRUB SERPL-MCNC: 1.1 MG/DL — SIGNIFICANT CHANGE UP (ref 0.2–1.2)
BUN SERPL-MCNC: 8 MG/DL — SIGNIFICANT CHANGE UP (ref 7–23)
CALCIUM SERPL-MCNC: 9.3 MG/DL — SIGNIFICANT CHANGE UP (ref 8.4–10.5)
CHLORIDE SERPL-SCNC: 102 MMOL/L — SIGNIFICANT CHANGE UP (ref 96–108)
CHOLEST SERPL-MCNC: 142 MG/DL — SIGNIFICANT CHANGE UP
CO2 SERPL-SCNC: 26 MMOL/L — SIGNIFICANT CHANGE UP (ref 22–31)
CREAT SERPL-MCNC: 0.99 MG/DL — SIGNIFICANT CHANGE UP (ref 0.5–1.3)
EGFR: 109 ML/MIN/1.73M2 — SIGNIFICANT CHANGE UP
GLUCOSE SERPL-MCNC: 69 MG/DL — LOW (ref 70–99)
HCT VFR BLD CALC: 43.4 % — SIGNIFICANT CHANGE UP (ref 39–50)
HDLC SERPL-MCNC: 45 MG/DL — SIGNIFICANT CHANGE UP
HGB BLD-MCNC: 15.2 G/DL — SIGNIFICANT CHANGE UP (ref 13–17)
LIDOCAIN IGE QN: 275 U/L — HIGH (ref 16–77)
LIPID PNL WITH DIRECT LDL SERPL: 87 MG/DL — SIGNIFICANT CHANGE UP
MCHC RBC-ENTMCNC: 30.8 PG — SIGNIFICANT CHANGE UP (ref 27–34)
MCHC RBC-ENTMCNC: 35 GM/DL — SIGNIFICANT CHANGE UP (ref 32–36)
MCV RBC AUTO: 88 FL — SIGNIFICANT CHANGE UP (ref 80–100)
NON HDL CHOLESTEROL: 97 MG/DL — SIGNIFICANT CHANGE UP
NRBC # BLD: 0 /100 WBCS — SIGNIFICANT CHANGE UP (ref 0–0)
PLATELET # BLD AUTO: 105 K/UL — LOW (ref 150–400)
POTASSIUM SERPL-MCNC: 4 MMOL/L — SIGNIFICANT CHANGE UP (ref 3.5–5.3)
POTASSIUM SERPL-SCNC: 4 MMOL/L — SIGNIFICANT CHANGE UP (ref 3.5–5.3)
PROT SERPL-MCNC: 7.3 G/DL — SIGNIFICANT CHANGE UP (ref 6–8.3)
RBC # BLD: 4.93 M/UL — SIGNIFICANT CHANGE UP (ref 4.2–5.8)
RBC # FLD: 11.4 % — SIGNIFICANT CHANGE UP (ref 10.3–14.5)
SODIUM SERPL-SCNC: 139 MMOL/L — SIGNIFICANT CHANGE UP (ref 135–145)
TRIGL SERPL-MCNC: 48 MG/DL — SIGNIFICANT CHANGE UP
WBC # BLD: 7.31 K/UL — SIGNIFICANT CHANGE UP (ref 3.8–10.5)
WBC # FLD AUTO: 7.31 K/UL — SIGNIFICANT CHANGE UP (ref 3.8–10.5)

## 2024-08-02 PROCEDURE — 96365 THER/PROPH/DIAG IV INF INIT: CPT

## 2024-08-02 PROCEDURE — 82787 IGG 1 2 3 OR 4 EACH: CPT

## 2024-08-02 PROCEDURE — 93005 ELECTROCARDIOGRAM TRACING: CPT

## 2024-08-02 PROCEDURE — 80061 LIPID PANEL: CPT

## 2024-08-02 PROCEDURE — 86850 RBC ANTIBODY SCREEN: CPT

## 2024-08-02 PROCEDURE — 84478 ASSAY OF TRIGLYCERIDES: CPT

## 2024-08-02 PROCEDURE — 85730 THROMBOPLASTIN TIME PARTIAL: CPT

## 2024-08-02 PROCEDURE — 99231 SBSQ HOSP IP/OBS SF/LOW 25: CPT

## 2024-08-02 PROCEDURE — 86900 BLOOD TYPING SEROLOGIC ABO: CPT

## 2024-08-02 PROCEDURE — 36415 COLL VENOUS BLD VENIPUNCTURE: CPT

## 2024-08-02 PROCEDURE — 82784 ASSAY IGA/IGD/IGG/IGM EACH: CPT

## 2024-08-02 PROCEDURE — 85610 PROTHROMBIN TIME: CPT

## 2024-08-02 PROCEDURE — 74176 CT ABD & PELVIS W/O CONTRAST: CPT | Mod: MC

## 2024-08-02 PROCEDURE — 86901 BLOOD TYPING SEROLOGIC RH(D): CPT

## 2024-08-02 PROCEDURE — 82150 ASSAY OF AMYLASE: CPT

## 2024-08-02 PROCEDURE — 99232 SBSQ HOSP IP/OBS MODERATE 35: CPT

## 2024-08-02 PROCEDURE — 99239 HOSP IP/OBS DSCHRG MGMT >30: CPT

## 2024-08-02 PROCEDURE — 80053 COMPREHEN METABOLIC PANEL: CPT

## 2024-08-02 PROCEDURE — 83690 ASSAY OF LIPASE: CPT

## 2024-08-02 PROCEDURE — 99285 EMERGENCY DEPT VISIT HI MDM: CPT

## 2024-08-02 PROCEDURE — 96375 TX/PRO/DX INJ NEW DRUG ADDON: CPT

## 2024-08-02 PROCEDURE — 96376 TX/PRO/DX INJ SAME DRUG ADON: CPT

## 2024-08-02 PROCEDURE — 83735 ASSAY OF MAGNESIUM: CPT

## 2024-08-02 PROCEDURE — 85025 COMPLETE CBC W/AUTO DIFF WBC: CPT

## 2024-08-02 PROCEDURE — 85027 COMPLETE CBC AUTOMATED: CPT

## 2024-08-02 PROCEDURE — 74177 CT ABD & PELVIS W/CONTRAST: CPT | Mod: MC

## 2024-08-02 PROCEDURE — 83605 ASSAY OF LACTIC ACID: CPT

## 2024-08-02 RX ADMIN — DEXTROSE MONOHYDRATE, SODIUM CHLORIDE, SODIUM LACTATE, CALCIUM CHLORIDE, MAGNESIUM CHLORIDE 125 MILLILITER(S): 1.5; 538; 448; 18.4; 5.08 SOLUTION INTRAPERITONEAL at 11:59

## 2024-08-02 RX ADMIN — Medication 4 MILLIGRAM(S): at 14:43

## 2024-08-02 RX ADMIN — PANTOPRAZOLE SODIUM 40 MILLIGRAM(S): 20 TABLET, DELAYED RELEASE ORAL at 11:59

## 2024-08-02 NOTE — DISCHARGE NOTE PROVIDER - CARE PROVIDER_API CALL
Marly Gan  Pediatrics  [address], [address]  [place] [address]  Phone: [phone]  Fax: [phone]  Follow Up Time:

## 2024-08-02 NOTE — PROGRESS NOTE ADULT - PROBLEM SELECTOR PLAN 1
Likely ETOH induced will r.o other causes   Advance to clear liquid diet with clear ensure supplement when ok with surgery   Continue IV fluids  Monitor lipase downtrending   amylase normal  IgG4 pending

## 2024-08-02 NOTE — PROGRESS NOTE ADULT - SUBJECTIVE AND OBJECTIVE BOX
Patient is a 23y old  Male who presents with a chief complaint of pancreatitis, nonobstructive intussusception (02 Aug 2024 06:49)      Patient seen and examined at bedside. No overnight events reported.   He had a BM and passing gas.     ALLERGIES:  dairy products (Unknown)  lactose (Vomiting)  No Known Drug Allergies    MEDICATIONS  (STANDING):  lactated ringers. 1000 milliLiter(s) (125 mL/Hr) IV Continuous <Continuous>  pantoprazole  Injectable 40 milliGRAM(s) IV Push daily    MEDICATIONS  (PRN):  acetaminophen     Tablet .. 650 milliGRAM(s) Oral every 6 hours PRN Mild Pain (1 - 3)  ondansetron Injectable 4 milliGRAM(s) IV Push every 6 hours PRN Nausea and/or Vomiting    Vital Signs Last 24 Hrs  T(F): 98.3 (02 Aug 2024 05:00), Max: 98.5 (01 Aug 2024 11:01)  HR: 71 (02 Aug 2024 05:00) (64 - 71)  BP: 120/59 (02 Aug 2024 05:00) (120/59 - 136/68)  RR: 17 (02 Aug 2024 05:00) (17 - 20)  SpO2: 99% (02 Aug 2024 05:00) (98% - 99%)  I&O's Summary    PHYSICAL EXAM:  General: NAD, A/O x 3  ENT: No gross hearing impairment, Moist mucous membranes, no thrush  Neck: Supple, No JVD  Lungs: Clear to auscultation bilaterally, good air entry, non-labored breathing  Cardio: RRR, S1/S2, No murmur  Abdomen: Soft, Nontender, Nondistended; Bowel sounds present  Extremities: No calf tenderness, No cyanosis, No pitting edema  Psych: Appropriate mood and affect    LABS:                        15.2   7.31  )-----------( 105      ( 02 Aug 2024 07:08 )             43.4     08-02    139  |  102  |  8   ----------------------------<  69  4.0   |  26  |  0.99    Ca    9.3      02 Aug 2024 07:08  Mg     1.9     08-01    TPro  7.3  /  Alb  3.8  /  TBili  1.1  /  DBili  x   /  AST  17  /  ALT  15  /  AlkPhos  91  08-02    Amylase: 105 U/L (08-01-24 @ 10:02)    Lipase: 275 U/L (08-02-24 @ 07:08)  Lipase: 351 U/L (08-01-24 @ 10:02)  Lipase: >375 U/L (08-01-24 @ 00:00)      PT/INR - ( 01 Aug 2024 10:02 )   PT: 11.6 sec;   INR: 0.99 ratio         PTT - ( 01 Aug 2024 10:02 )  PTT:34.5 sec  Lactate, Blood: 0.7 mmol/L (08-01 @ 10:02)          08-01 Chol 142 mg/dL LDL -- HDL 45 mg/dL Trig 48 mg/dL                  Urinalysis Basic - ( 02 Aug 2024 07:08 )    Color: x / Appearance: x / SG: x / pH: x  Gluc: 69 mg/dL / Ketone: x  / Bili: x / Urobili: x   Blood: x / Protein: x / Nitrite: x   Leuk Esterase: x / RBC: x / WBC x   Sq Epi: x / Non Sq Epi: x / Bacteria: x          RADIOLOGY & ADDITIONAL TESTS:  < from: CT Abdomen and Pelvis w/ Oral Cont (08.01.24 @ 11:23) >      IMPRESSION: Pancreatitis of the tail. No evidence of bowel abnormality.      < end of copied text >    Care Discussed with Consultants/Other Providers:    Patient is a 23y old  Male who presents with a chief complaint of pancreatitis, nonobstructive intussusception (02 Aug 2024 06:49)      Patient seen and examined at bedside. No overnight events reported.   He had a BM and passing gas.   He has some abd pain, but much better, no vomiting.     ALLERGIES:  dairy products (Unknown)  lactose (Vomiting)  No Known Drug Allergies    MEDICATIONS  (STANDING):  lactated ringers. 1000 milliLiter(s) (125 mL/Hr) IV Continuous <Continuous>  pantoprazole  Injectable 40 milliGRAM(s) IV Push daily    MEDICATIONS  (PRN):  acetaminophen     Tablet .. 650 milliGRAM(s) Oral every 6 hours PRN Mild Pain (1 - 3)  ondansetron Injectable 4 milliGRAM(s) IV Push every 6 hours PRN Nausea and/or Vomiting    Vital Signs Last 24 Hrs  T(F): 98.3 (02 Aug 2024 05:00), Max: 98.5 (01 Aug 2024 11:01)  HR: 71 (02 Aug 2024 05:00) (64 - 71)  BP: 120/59 (02 Aug 2024 05:00) (120/59 - 136/68)  RR: 17 (02 Aug 2024 05:00) (17 - 20)  SpO2: 99% (02 Aug 2024 05:00) (98% - 99%)  I&O's Summary    PHYSICAL EXAM:  General: NAD, A/O x 3  ENT: No gross hearing impairment, Moist mucous membranes, no thrush  Neck: Supple, No JVD  Lungs: Clear to auscultation bilaterally, good air entry, non-labored breathing  Cardio: RRR, S1/S2, No murmur  Abdomen: Soft, mildly tender, Nondistended; Bowel sounds present  Extremities: No calf tenderness, No cyanosis, No pitting edema  Psych: Appropriate mood and affect    LABS:                        15.2   7.31  )-----------( 105      ( 02 Aug 2024 07:08 )             43.4     08-02    139  |  102  |  8   ----------------------------<  69  4.0   |  26  |  0.99    Ca    9.3      02 Aug 2024 07:08  Mg     1.9     08-01    TPro  7.3  /  Alb  3.8  /  TBili  1.1  /  DBili  x   /  AST  17  /  ALT  15  /  AlkPhos  91  08-02    Amylase: 105 U/L (08-01-24 @ 10:02)    Lipase: 275 U/L (08-02-24 @ 07:08)  Lipase: 351 U/L (08-01-24 @ 10:02)  Lipase: >375 U/L (08-01-24 @ 00:00)      PT/INR - ( 01 Aug 2024 10:02 )   PT: 11.6 sec;   INR: 0.99 ratio         PTT - ( 01 Aug 2024 10:02 )  PTT:34.5 sec  Lactate, Blood: 0.7 mmol/L (08-01 @ 10:02)          08-01 Chol 142 mg/dL LDL -- HDL 45 mg/dL Trig 48 mg/dL                  Urinalysis Basic - ( 02 Aug 2024 07:08 )    Color: x / Appearance: x / SG: x / pH: x  Gluc: 69 mg/dL / Ketone: x  / Bili: x / Urobili: x   Blood: x / Protein: x / Nitrite: x   Leuk Esterase: x / RBC: x / WBC x   Sq Epi: x / Non Sq Epi: x / Bacteria: x          RADIOLOGY & ADDITIONAL TESTS:  < from: CT Abdomen and Pelvis w/ Oral Cont (08.01.24 @ 11:23) >      IMPRESSION: Pancreatitis of the tail. No evidence of bowel abnormality.      < end of copied text >    Care Discussed with Consultants/Other Providers:

## 2024-08-02 NOTE — PROGRESS NOTE ADULT - SUBJECTIVE AND OBJECTIVE BOX
HD#2      had bm (soft and small)    passing flatus    feels better      afebrile    looks comfortable    abdomen-less distended, soft and non tender    labs:    pending

## 2024-08-02 NOTE — PROGRESS NOTE ADULT - ASSESSMENT
23M no sig PMhx pw pancreatitis and nonobstructive intussusception.    #Pancreatitis likely ETOH induced  -CT of A/P with oral contrast done on 8/1:  pancreatitis of the tail, no bowel abnormality  -Dr. Franco Surgery following; pt started on clear liquid diet  -cont IVF  -no evidence of gallstones on CT  -Lipase is trending down, normal LFTs  -GI following: amylase normal, IgG studies neg    #Intussusception nonobstructive  -Surgery Dr Franco following; no surgical intervention    Dispo:  family at bedside   23M no sig PMhx pw pancreatitis and nonobstructive intussusception.    #Pancreatitis likely ETOH induced  -CT of A/P with oral contrast done on 8/1:  pancreatitis of the tail, no bowel abnormality  -Dr. Franco Surgery following; pt started on clear liquid diet  -cont IVF  -no evidence of gallstones on CT  -Lipase is trending down, normal LFTs  -GI following: amylase normal, IgG studies neg    #Intussusception nonobstructive; resolved  -CT of A/P with oral contrast - no bowel abnormality  -Surgery Dr Franco following; no surgical intervention    Dispo:  girlfriend at bedside, he will update his Mother on plan of care, anticipate D/C home in 24 hrs.

## 2024-08-02 NOTE — PROGRESS NOTE ADULT - SUBJECTIVE AND OBJECTIVE BOX
INTERVAL HPI/OVERNIGHT EVENTS:  HPI:    24 y/o male seen and examined. + flatus, reports some bloating. Tolerating liquids. Reports 1/10 pain level, was 8/10.       MEDICATIONS  (STANDING):  lactated ringers. 1000 milliLiter(s) (125 mL/Hr) IV Continuous <Continuous>  pantoprazole  Injectable 40 milliGRAM(s) IV Push daily    MEDICATIONS  (PRN):  acetaminophen     Tablet .. 650 milliGRAM(s) Oral every 6 hours PRN Mild Pain (1 - 3)  ondansetron Injectable 4 milliGRAM(s) IV Push every 6 hours PRN Nausea and/or Vomiting      Allergies    dairy products (Unknown)  lactose (Vomiting)  No Known Drug Allergies    Intolerances        PAST MEDICAL & SURGICAL HISTORY:  Triplanar ankle fracture, closed, right, sequela      H/O circumcision      Scalp laceration  s/p staples Summer 2015      History of ankle surgery    PHYSICAL EXAM:   Vital Signs:  Vital Signs Last 24 Hrs  T(C): 36.8 (02 Aug 2024 05:00), Max: 36.8 (02 Aug 2024 05:00)  T(F): 98.3 (02 Aug 2024 05:00), Max: 98.3 (02 Aug 2024 05:00)  HR: 71 (02 Aug 2024 05:00) (64 - 71)  BP: 120/59 (02 Aug 2024 05:00) (120/59 - 121/71)  BP(mean): --  RR: 17 (02 Aug 2024 05:00) (17 - 18)  SpO2: 99% (02 Aug 2024 05:00) (98% - 99%)    Parameters below as of 02 Aug 2024 05:00  Patient On (Oxygen Delivery Method): room air      Daily     Daily I&O's Summary      GENERAL:  Appears stated age  HEENT:  NC/AT,  conjunctivae clear and pink,  CHEST:  Full & symmetric excursion, no increased effort, breath sounds clear  HEART:  Regular rhythm, S1, S2, no murmur  ABDOMEN:  Soft, non-tender, non-distended, normoactive bowel sounds  EXTEREMITIES:  no edema  SKIN:  No rash/warm/dry  NEURO:  Alert, oriented,       LABS:                        15.2   7.31  )-----------( 105      ( 02 Aug 2024 07:08 )             43.4     08-02    139  |  102  |  8   ----------------------------<  69<L>  4.0   |  26  |  0.99    Ca    9.3      02 Aug 2024 07:08  Mg     1.9     08-01    TPro  7.3  /  Alb  3.8  /  TBili  1.1  /  DBili  x   /  AST  17  /  ALT  15  /  AlkPhos  91  08-02    PT/INR - ( 01 Aug 2024 10:02 )   PT: 11.6 sec;   INR: 0.99 ratio         PTT - ( 01 Aug 2024 10:02 )  PTT:34.5 sec  Urinalysis Basic - ( 02 Aug 2024 07:08 )    Color: x / Appearance: x / SG: x / pH: x  Gluc: 69 mg/dL / Ketone: x  / Bili: x / Urobili: x   Blood: x / Protein: x / Nitrite: x   Leuk Esterase: x / RBC: x / WBC x   Sq Epi: x / Non Sq Epi: x / Bacteria: x      amylaseAmylase: 105 U/L (08-01 @ 10:02)     lipaseLipase: 275 U/L (08-02 @ 07:08)  Lipase: 351 U/L (08-01 @ 10:02)  Lipase: >375 U/L (08-01 @ 00:00)    RADIOLOGY & ADDITIONAL TESTS:

## 2024-08-02 NOTE — PROGRESS NOTE ADULT - ASSESSMENT
GI consulted due to pancreatitis. Patient seen and examined at bedside. Denies nausea, vomiting, diarrhea, fever/chills bodyaches. He notes some RUQ pain on exam. + flatus, + small BM this afternoon. Denies previous h/o symptoms. He does complain of some rectal itching and burning at times which has been going on x 6 months. He notes some blood when he wipes after BM on occasion.

## 2024-08-02 NOTE — DISCHARGE NOTE PROVIDER - NSDCCPCAREPLAN_GEN_ALL_CORE_FT
PRINCIPAL DISCHARGE DIAGNOSIS  Diagnosis: Jejunal intussusception  Assessment and Plan of Treatment:   -you had a condition in which part of your intestines slides into an adjacent part of the intestine sometimes causing a blockage, you did NOT have a blockage   -you were evaluated by a Surgeon, no surgical intervention was recommended  -intussusception has resolved on its own  -follow up with Dr. Franco as directed by him      SECONDARY DISCHARGE DIAGNOSES  Diagnosis: Acute pancreatitis  Assessment and Plan of Treatment:   -AVOID ALCOHOL  -stay hydrated  -if you have severe abdominal pain, unable to tolerate food call your MD immediately or go to the ER  -follow up with your PCP, Dr. Gan within 3-5 days

## 2024-08-02 NOTE — DISCHARGE NOTE PROVIDER - HOSPITAL COURSE
Hospital Course  23M no sig PMhx pw pancreatitis and nonobstructive intussusception.    #Pancreatitis likely ETOH induced  -CT of A/P with oral contrast done on 8/1:  pancreatitis of the tail, no bowel abnormality  -Dr. Franco Surgery following; pt started on clear liquid diet  -cont IVF  -no evidence of gallstones on CT  -Lipase is trending down, normal LFTs  -GI following: amylase normal, IgG studies neg    #Intussusception nonobstructive; resolved  -CT of A/P with oral contrast - no bowel abnormality  -Surgery Dr Franco following; no surgical intervention    Dispo:  girlfriend at bedside, he will update his Mother on plan of care, anticipate D/C home in 24 hrs.                      Discharging Provider:  DARNELL Stewart  Contact Info: Cell [phone] - Please call with any questions or concerns.     Hospital Course  23M no sig PMhx pw pancreatitis and nonobstructive intussusception. On admission he had a CT of abd which showed acute interstitial pancreatitis and non-obstructive intussusception.  He was evaluated by GI and Surgery.  Lipase did trend down, LFT's normal  and repeat CT of A/P with oral contrast done on 8/1:  pancreatitis of the tail, no bowel abnormality, indicating intussusception has resolved.  No gallstones on CT.  Pt. was able to tolerate diet and was discharged home.     Discharging Provider:  DARNELL Stewart  Contact Info: Cell [phone] - Please call with any questions or concerns.     Hospital Course  24 y/o M no sig PMHx pw pancreatitis and nonobstructive intussusception. On admission he had a CT of abd which showed acute interstitial pancreatitis and non-obstructive intussusception.  He was evaluated by GI and Surgery.  Lipase did trend down, LFT's normal and repeat CT of A/P with oral contrast done on 8/1:  pancreatitis of the tail, no bowel abnormality, indicating intussusception has resolved.  No gallstones on CT.  Pt. was able to tolerate diet and was discharged home.     Discharging Provider:  DARNELL Stewart  Contact Info: Cell [phone] - Please call with any questions or concerns.    Time Spent: 45 minutes

## 2024-08-02 NOTE — PROGRESS NOTE ADULT - ASSESSMENT
a/p    resolving jejunal issues      will start clear liquid diet today      thank you    dr freda michaels  cell#[phone]

## 2024-08-02 NOTE — DISCHARGE NOTE NURSING/CASE MANAGEMENT/SOCIAL WORK - PATIENT PORTAL LINK FT
You can access the FollowMyHealth Patient Portal offered by [facility] by registering at the following website: [email or web address]. By joining [employer]’s FollowMyHealth portal, you will also be able to view your health information using other applications (apps) compatible with our system.

## 2024-08-02 NOTE — PROGRESS NOTE ADULT - NS ATTEND AMEND GEN_ALL_CORE FT
24 y/o M admitted for pancreatitis, improving, upgrade to regular diet today, if well tolerated, anticipate discharge home today. mother updated at bedside.
Agree with assessment and plan as above.    22 y/o M with no significant past medical history p/w acute EtOH associated pancreatitis. GI consulted for management of pancreatitis. Patient s/p treatment with fluids with clinical improvement. Advance diet as tolerated. If tolerating diet without issue OK to discharge from GI perspective with outpatient follow up. Appreciate surgical consultation and recommendations.      >35 minutes was spent on direct patient care

## 2024-08-03 NOTE — PROGRESS NOTE ADULT - REASON FOR ADMISSION
pancreatitis, nonobstructive intussusception
(1) More than 48 hours/None

## 2024-08-03 NOTE — PROGRESS NOTE ADULT - ASSESSMENT
23M no significant PMhx presents with pancreatitis and nonobstructive intussusception.     #Pancreatitis likely ETOH induced  - CT of A/P with oral contrast done on 8/1:  pancreatitis of the tail, no bowel abnormality  - Dr. Franco Surgery following; pt advanced to regular diet   - IV protonix   - no evidence of gallstones on CT  - Lipase is trending down, normal LFTs  - GI following: amylase normal, IgG studies neg    #Intussusception nonobstructive; resolved  - CT of A/P with oral contrast - no bowel abnormality  - Surgery Dr Franco following; no surgical intervention    #DVTppx - low risk, ambulatory     #GOC full code     Dispo: discharge planning     8/3 patient updated on plan of care, to update family himself

## 2024-08-30 ENCOUNTER — APPOINTMENT (OUTPATIENT)
Dept: FAMILY MEDICINE | Facility: CLINIC | Age: 24
End: 2024-08-30
Payer: COMMERCIAL

## 2024-08-30 ENCOUNTER — LABORATORY RESULT (OUTPATIENT)
Age: 24
End: 2024-08-30

## 2024-08-30 VITALS
WEIGHT: 168 LBS | SYSTOLIC BLOOD PRESSURE: 103 MMHG | BODY MASS INDEX: 26.37 KG/M2 | DIASTOLIC BLOOD PRESSURE: 70 MMHG | RESPIRATION RATE: 16 BRPM | HEART RATE: 79 BPM | HEIGHT: 67 IN | TEMPERATURE: 97.8 F | OXYGEN SATURATION: 98 %

## 2024-08-30 DIAGNOSIS — Z47.89 ENCOUNTER FOR OTHER ORTHOPEDIC AFTERCARE: ICD-10-CM

## 2024-08-30 DIAGNOSIS — Z87.828 PERSONAL HISTORY OF OTHER (HEALED) PHYSICAL INJURY AND TRAUMA: ICD-10-CM

## 2024-08-30 DIAGNOSIS — L29.0 PRURITUS ANI: ICD-10-CM

## 2024-08-30 DIAGNOSIS — S82.891A OTHER FRACTURE OF RIGHT LOWER LEG, INITIAL ENCOUNTER FOR CLOSED FRACTURE: ICD-10-CM

## 2024-08-30 DIAGNOSIS — K85.20 ALCOHOL INDUCED ACUTE PANCREATITIS WITHOUT NECROSIS OR INFECTION: ICD-10-CM

## 2024-08-30 DIAGNOSIS — K56.1 INTUSSUSCEPTION: ICD-10-CM

## 2024-08-30 PROCEDURE — 99203 OFFICE O/P NEW LOW 30 MIN: CPT

## 2024-08-30 NOTE — HISTORY OF PRESENT ILLNESS
[FreeTextEntry8] : Pt comes in for eval of anal itching. For about 9 months having anal itching and cuts in that area from scratching. It isn't every day, but at least 4-5x/week. Has tried to use aquaphor and topical benadryl, prep H wipes, sitz baths w/ Epsom salts. None of it is working. Showers twice daily frequently. Has noticed a rash. Sometimes there is blood when he wipes. Appetite slightly less than normal.   About 3 weeks ago patient was hospitalized for abd pain and found to have intussusception and pancreatitis of tail of pancreas. Admitted to having had a lot of alcohol night before that happened.

## 2024-08-30 NOTE — REVIEW OF SYSTEMS
[Abdominal Pain] : no abdominal pain [Nausea] : no nausea [Constipation] : no constipation [Diarrhea] : no diarrhea [Vomiting] : no vomiting [Heartburn] : no heartburn [Negative] : Constitutional [FreeTextEntry7] : anal itching

## 2024-08-30 NOTE — ASSESSMENT
[FreeTextEntry1] : # pruritus ani f/u scotch tape test for pinworm May need to repeat on a morning before he showers Referred to GI  # intussusception and pancreatitis Strongly urged patient to avoid excessive alcohol use Referred to GI  f/u pending lab results

## 2024-08-30 NOTE — PHYSICAL EXAM
[Normal] : no acute distress, well nourished, well developed and well-appearing [Normal Sclera/Conjunctiva] : normal sclera/conjunctiva [Normal Outer Ear/Nose] : the outer ears and nose were normal in appearance [No Respiratory Distress] : no respiratory distress  [Soft] : abdomen soft [Non Tender] : non-tender [Non-distended] : non-distended [No Masses] : no abdominal mass palpated [Normal Gait] : normal gait [Normal Affect] : the affect was normal [Alert and Oriented x3] : oriented to person, place, and time [Normal Insight/Judgement] : insight and judgment were intact [FreeTextEntry1] : chaperone present - mild erythema of perineum with no rash, no ext hemorrhoids visible

## 2025-01-09 ENCOUNTER — APPOINTMENT (OUTPATIENT)
Dept: OTOLARYNGOLOGY | Facility: CLINIC | Age: 25
End: 2025-01-09
Payer: COMMERCIAL

## 2025-01-09 VITALS
SYSTOLIC BLOOD PRESSURE: 125 MMHG | WEIGHT: 180.38 LBS | HEIGHT: 67 IN | DIASTOLIC BLOOD PRESSURE: 83 MMHG | HEART RATE: 76 BPM | BODY MASS INDEX: 28.31 KG/M2

## 2025-01-09 DIAGNOSIS — H69.93 UNSPECIFIED EUSTACHIAN TUBE DISORDER, BILATERAL: ICD-10-CM

## 2025-01-09 DIAGNOSIS — F19.90 OTHER PSYCHOACTIVE SUBSTANCE USE, UNSPECIFIED, UNCOMPLICATED: ICD-10-CM

## 2025-01-09 DIAGNOSIS — H93.291 OTHER ABNORMAL AUDITORY PERCEPTIONS, RIGHT EAR: ICD-10-CM

## 2025-01-09 DIAGNOSIS — J01.90 ACUTE SINUSITIS, UNSPECIFIED: ICD-10-CM

## 2025-01-09 DIAGNOSIS — K31.9 DISEASE OF STOMACH AND DUODENUM, UNSPECIFIED: ICD-10-CM

## 2025-01-09 PROCEDURE — 99204 OFFICE O/P NEW MOD 45 MIN: CPT

## 2025-01-09 PROCEDURE — 92557 COMPREHENSIVE HEARING TEST: CPT

## 2025-01-09 PROCEDURE — 92567 TYMPANOMETRY: CPT

## 2025-01-09 RX ORDER — FLUTICASONE PROPIONATE 50 UG/1
50 SPRAY, METERED NASAL
Qty: 3 | Refills: 3 | Status: ACTIVE | COMMUNITY
Start: 2025-01-09 | End: 1900-01-01

## 2025-01-09 RX ORDER — AMOXICILLIN AND CLAVULANATE POTASSIUM 875; 125 MG/1; MG/1
875-125 TABLET, COATED ORAL
Qty: 20 | Refills: 0 | Status: ACTIVE | COMMUNITY
Start: 2025-01-09 | End: 1900-01-01

## 2025-01-14 NOTE — PATIENT PROFILE ADULT - NSPROGENOTHERPROVIDER_GEN_A_NUR
Patient provided discharge instructions, no new prescriptions, education and follow up information. Pt verbalizing understanding. Pt A&OX4, respirations unlabored on RA. Pain controlled. Patient ambulatory out of ER.    none